# Patient Record
Sex: MALE | Race: WHITE | Employment: FULL TIME | URBAN - METROPOLITAN AREA
[De-identification: names, ages, dates, MRNs, and addresses within clinical notes are randomized per-mention and may not be internally consistent; named-entity substitution may affect disease eponyms.]

---

## 2017-01-09 ENCOUNTER — OFFICE VISIT (OUTPATIENT)
Dept: FAMILY MEDICINE CLINIC | Facility: CLINIC | Age: 37
End: 2017-01-09

## 2017-01-09 VITALS
HEART RATE: 76 BPM | WEIGHT: 221 LBS | TEMPERATURE: 98 F | DIASTOLIC BLOOD PRESSURE: 80 MMHG | SYSTOLIC BLOOD PRESSURE: 134 MMHG | BODY MASS INDEX: 28 KG/M2 | RESPIRATION RATE: 16 BRPM

## 2017-01-09 DIAGNOSIS — J01.01 ACUTE RECURRENT MAXILLARY SINUSITIS: Primary | ICD-10-CM

## 2017-01-09 PROCEDURE — 99213 OFFICE O/P EST LOW 20 MIN: CPT | Performed by: FAMILY MEDICINE

## 2017-01-09 RX ORDER — AZITHROMYCIN 250 MG/1
TABLET, FILM COATED ORAL
Qty: 6 TABLET | Refills: 0 | Status: SHIPPED | OUTPATIENT
Start: 2017-01-09 | End: 2017-02-02 | Stop reason: ALTCHOICE

## 2017-01-09 NOTE — PROGRESS NOTES
Juliette Child is a 31year old male.    Patient presents with:  Sinusitis: Symptoms started about two weeks ago    HPI:    Symptoms started  14  days ago with purulent nasal discharge, maxillary sinus pressure, and headache, a lot of  post-nasal dri

## 2017-01-09 NOTE — PATIENT INSTRUCTIONS
Understanding Your Sinuses  Your sinuses are air-filled spaces between the bones in your head. They have small openings that connect to the nasal cavity. The sinuses make mucus that drains into the nose.  This helps keep the nose moist and free of dust an · Drink plenty of water, hot tea, and other liquids. This may help thin mucus. It also may promote sinus drainage. · Heat may help soothe painful areas of the face. Use a towel soaked in hot water.  Or,  the shower and direct the hot spray onto you · Unusual drowsiness or confusion  · Swelling of the forehead or eyelids  · Vision problems, including blurred or double vision  · Fever of 100.4ºF (38ºC) or higher, or as directed by your healthcare provider  · Seizure  · Breathing problems  · Symptoms no

## 2017-02-02 ENCOUNTER — OFFICE VISIT (OUTPATIENT)
Dept: FAMILY MEDICINE CLINIC | Facility: CLINIC | Age: 37
End: 2017-02-02

## 2017-02-02 VITALS
RESPIRATION RATE: 16 BRPM | DIASTOLIC BLOOD PRESSURE: 78 MMHG | TEMPERATURE: 99 F | BODY MASS INDEX: 28 KG/M2 | WEIGHT: 224 LBS | OXYGEN SATURATION: 98 % | HEART RATE: 79 BPM | SYSTOLIC BLOOD PRESSURE: 138 MMHG

## 2017-02-02 DIAGNOSIS — J11.1 INFLUENZA-LIKE ILLNESS: Primary | ICD-10-CM

## 2017-02-02 PROCEDURE — 99213 OFFICE O/P EST LOW 20 MIN: CPT | Performed by: PHYSICIAN ASSISTANT

## 2017-02-02 RX ORDER — AZITHROMYCIN 250 MG/1
TABLET, FILM COATED ORAL
Qty: 6 TABLET | Refills: 0 | Status: SHIPPED | OUTPATIENT
Start: 2017-02-02 | End: 2017-12-15 | Stop reason: ALTCHOICE

## 2017-02-03 NOTE — PROGRESS NOTES
CHIEF COMPLAINT:   Patient presents with:  Flu: flu symptoms x3days       HPI:   Teresa Hawkins is a 31year old male who presents for flu-like sxs for  3 days.   Patient reports fevers with tmax of 102.1, chills, body aches, sweats, HA, nasal conges THROAT: oral mucosa pink, moist. Posterior pharynx minimally erythematous and injected. No exudates. No uvular deviation, drooling, muffled voice, hot potato voice, trismus, or signs of abscess.    NECK: Supple, non-tender  LUNGS: clear to auscultation christina Influenza is also called the flu. It is a viral illness that affects the air passages of your lungs. It is different from the common cold. The flu can easily be passed from one to person to another.  It may be spread through the air by coughing and sneezing If you are 72 or older, talk with your provider about getting a pneumococcal vaccine every 5 years. You should also get this vaccine if you have chronic asthma or COPD. All adults should get a flu vaccine every fall. Ask your provider about this.   When to

## 2017-02-03 NOTE — PATIENT INSTRUCTIONS
-May take antibiotic in 3-4 days if symptoms are not improving. At this point in time, symptoms seem to be related to flu  -Cool mist humidifier   -Warm tea with honey  -Mucinex      Influenza (Adult)    Influenza is also called the flu.  It is a viral ill Follow up with your healthcare provider, or as advised, if you are not getting better over the next week. If you are 72 or older, talk with your provider about getting a pneumococcal vaccine every 5 years.  You should also get this vaccine if you have

## 2017-12-15 PROBLEM — F41.9 ANXIETY: Status: ACTIVE | Noted: 2017-12-15

## 2017-12-15 PROBLEM — E78.1 HYPERTRIGLYCERIDEMIA: Status: ACTIVE | Noted: 2017-12-15

## 2017-12-15 PROBLEM — R73.09 ABNORMAL GLUCOSE: Status: ACTIVE | Noted: 2017-12-15

## 2017-12-15 PROBLEM — E78.00 PURE HYPERCHOLESTEROLEMIA: Status: ACTIVE | Noted: 2017-12-15

## 2018-07-09 PROBLEM — Z00.00 HEALTH CARE MAINTENANCE: Status: ACTIVE | Noted: 2018-07-09

## (undated) NOTE — MR AVS SNAPSHOT
[address] [facility] [address] [phone]               Thank you for choosing us for your health care visit with RAINER Ronquillo.   We are glad to serve you and happy to provide you with this summary of y · Nausea and loss of appetite are common with the flu. Eat light meals. Drink 6 to 8 glasses of liquids every day. Good choices are water, sport drinks, soft drinks without caffeine, juices, tea, and soup.  Extra fluids will also help loosen secretions in y 138/78 mmHg 79 98.6 °F (37 °C) (Oral) 224 lb           Current Medications          This list is accurate as of: 2/2/17  7:14 PM.  Always use your most recent med list.                azithromycin 250 MG Tabs   Take 2 tabs day 1.   Days 2-5, take one tab d

## (undated) NOTE — MR AVS SNAPSHOT
[address] [address] [phone]               Thank you for choosing us for your health care visit with Rowan Arellano MD.  We are glad to serve you and happy to provide you with this summary of Sinusitis (Antibiotic Treatment)    The sinuses are air-filled spaces within the bones of the face. They connect to the inside of the nose. Sinusitis is an inflammation of the tissue lining the sinus cavity. Sinus inflammation can occur during a cold.  It c unless told to by your health care provider. Rinsing may spread the infection to other sinuses. · Use acetaminophen or ibuprofen to control pain, unless another pain medicine was prescribed.  (If you have chronic liver or kidney disease or ever had a stoma [employer] will allow you to access patient instructions from your recent visit,  view other health information, and more. To sign up or find more information, go to [email or web address] and click on the Sign Up Now link in the [employer] box.      Enter 2 ½ hours per week – spread out over time Use a scooter to keep you motivated   Don’t forget strength training with weights and resistance Set goals and track your progress   You don’t need to join a gym. Home exercises work great.  Put more priority on exe